# Patient Record
(demographics unavailable — no encounter records)

---

## 2024-11-26 NOTE — ASSESSMENT
[FreeTextEntry1] : Mr. JOSÉ MIGUEL SWEET is a 55 year year old man presents with change in LUTS. Two weeks of increased frequency, urgency, nocturia. No significant dysuria. Possible infection or prostatitis. Will check UA and urine culture. If positive, will treat. If negative, will likely treat empirically for prostatitis.   - F/U UA, UCx

## 2024-11-26 NOTE — PHYSICAL EXAM
[Normal Appearance] : normal appearance [Well Groomed] : well groomed [General Appearance - In No Acute Distress] : no acute distress [Edema] : no peripheral edema [Respiration, Rhythm And Depth] : normal respiratory rhythm and effort [Exaggerated Use Of Accessory Muscles For Inspiration] : no accessory muscle use [Abdomen Soft] : soft [Abdomen Tenderness] : non-tender [Costovertebral Angle Tenderness] : no ~M costovertebral angle tenderness [Normal Station and Gait] : the gait and station were normal for the patient's age [] : no rash [No Focal Deficits] : no focal deficits [Affect] : the affect was normal [Oriented To Time, Place, And Person] : oriented to person, place, and time [Mood] : the mood was normal

## 2024-11-26 NOTE — HISTORY OF PRESENT ILLNESS
[FreeTextEntry1] : 7/20/22: 55 year old man with history of kidney stone, presented to [facility] ED on 6/28/22 with left lower quadrant abdominal pain found to have a 4 mm left distal ureteral stone. He has had intermittent LLQ and penile pain with hematuria since then. He passed a 1 mm stone fragment, but has not caught a larger stone. He denies fevers, chills, dysuria.   8/9/22: Completed renal ultrasound 7/30/22. No hydronephrosis. Possible left distal ureteral stone, though on my review, this corresponds with phlebolith seen on CT scan. No pain. Continues to have occasional urinary hesitancy. No fevers, chills, dysuria, hematuria.  2/7/24: Doing well. No major change in urinary symptoms. No stone episodes. No fevers, chills, dysuria, hematuria. Mild LUTS that are not bothersome (frequency, hesitancy, nocturia).  11/26/24: Here due to increased nocturia. Now states nocturia x4, waking every 2 hours to urinate. This is interrupting his sleeping pattern which is making him more tired during the day. He also has low libido due to being tired.   Denies specific flank pain, but has chronic back pain and wears brace. Denies dysuria or hematuria. Most recent renal imaging was US in 2023 which showed no stones or hydro. Never had PSA done last visit.  PVR: 0 ml   : ID [id number] Jayden

## 2025-05-05 NOTE — ASSESSMENT
[FreeTextEntry1] :  Plan:  - Update HCC screening with MRI Abd - Repeat labs in 2 weeks - d/c Atorvastatin and f/u with Cardiologist for different HLD tx  - f/u appt 1 month  Michelet Maya is a 56 y/o male with compensated HBV Cirrhosis and HDV infection with plan to start Bulevirtide, here for follow-up.  #HBV Cirrhosis, well compensated, MELD 9 - Euvolemic on exam, no indication for diuretics - C/w daily weights and report > 2-3 lb/day weight gain - HE: well controlled, c/w Lactulose titrated to ~2-3 BMs/day - Reinforced patient is not allowed to drive or operate heavy machinery  - pHTN: 9/2024 EGD: No EV or PHG; previously tried on Carvedilol based on Baveno 7 criteria and did not tolerate - C/w home BP/HR monitoring - HCC: 8/22/24 MRI Abd w/o c/f malignancy > update HCC screening now - C/w Tenofovir for tx of HBV  - Plan to start HDV tx w/ Bulevirtide - C/w Methadone 190 mg/day (recently reduced by 5 mg) > continue taper as tolerated - Maintain ETOH cessation and reviewed risk for liver decompensation with relapse - Advised to avoid nephroxtoxic agents - Counseled on importance of preventing sarcopenia with high protein and low Na diet > encouraged to optimize metabolic profile - Reinforced s/s of liver decompensation and advised to report immediately - No indication for liver txp evaluation given well compensated disease and low MELD; will monitor clinical trajectory and refer if required  "Michelet Maya was seen today to initiate treatment with Bulevirtide 2mg subcutaneous injection, supplied by [employer], for Hepatitis Delta Virus (HDV). This medication is currently under FDA review for this indication and is being provided through a compassionate use program approved by the [facility] IRB. Mr. Maya provided informed consent for treatment with Bulevirtide after a thorough discussion of the risks and benefits. Mr. Maya and his significant other, Miryam, were present for patient education, which was provided by YAIR Anderson, PharmD Linda Inman, and JAMAL.   Education included: Mechanism of Action and Administration: Instruction on proper self-injection technique and medication storage. Potential Side Effects: Discussion of potential adverse events, including headache, pruritus, fatigue, eosinophilia, injection-site reactions, upper abdominal pain, arthralgia, and asthenia. These were reported as more common with Bulevirtide 2mg/10mg than in control groups, generally mild to moderate in severity, and typically did not lead to discontinuation in trials (NEJM). The incidence of these adverse events demonstrated a dose-dependent relationship in the Phase 3 bulevirtide trial (Chaya et al., NEJM), with higher frequencies observed in the 2 mg and 10 mg cohorts compared to the control group. Monitoring: Explained the importance of monitoring through laboratory testing in 2 weeks and monthly clinic visits. Mr. Casey were encouraged to call with any new symptoms or questions. Drug Interactions: Due to a potential drug-drug interaction with bulevirtide, his atorvastatin was discontinued. Mr. Maya will contact his cardiologist to discuss starting Repatha. Disease Management: Current medication list reviewed. No current evidence of worsening liver disease. Importance of continuing HCC screening reinforced. Mr. Maya and Miryam verbalized understanding of the treatment plan, potential side effects, monitoring schedule, and the need to discontinue atorvastatin and discuss Repatha with his cardiologist. The patient had ample opportunity to address any questions. There is no contraindications to proceed with medication initiation today.  Will proceed with injection/teaching of Bulevirtide.  Update labs in 2 weeks. RTC in 1 month. Patient seen and plan discussed with Dr. Brian.  Renetta Anderson, MSN, [facility] Transplant Hepatology Nurse Practitioner [facility] [address] [place] [id number] T: [phone] | F: [phone]

## 2025-05-05 NOTE — REVIEW OF SYSTEMS
[Fever] : no fever [Chills] : no chills [Feeling Poorly] : not feeling poorly [Feeling Tired] : not feeling tired [Recent Weight Gain (___ Lbs)] : no recent weight gain [Recent Weight Loss (___ Lbs)] : no recent weight loss [Chest Pain] : no chest pain [Palpitations] : no palpitations [Lower Ext Edema] : no extremity edema [Shortness Of Breath] : no shortness of breath [Cough] : no cough [Abdominal Pain] : no abdominal pain [Vomiting] : no vomiting [Constipation] : no constipation [Diarrhea] : no diarrhea [Melena] : no melena [Dysuria] : no dysuria [Limb Swelling] : no limb swelling [Itching] : no itching [Confused] : no confusion [Dizziness] : no dizziness

## 2025-05-05 NOTE — HISTORY OF PRESENT ILLNESS
[de-identified] : 56 y/o male with a PMH of HEVER on CPAP, GERD, kidney stones, CAD, thoracic aortic aneurysm, PAD, HLD, Chronic HBV and compensated cirrhosis, here for follow-up. Pt accompanied by significant other, Miryam. Feels well; no more smoking; meds reviewed. possible DDI with statin and new med reviewed.  no worsening liver decompensation symptoms Previous note Longstanding pt of Dr. Brian and followed at both [facility] and [facility]. Patient with longstanding hx of compensated HBV cirrhosis -- compliant with Tenofovir. No hx of liver lesions or HCC. Tried on Carvedilol 3.125 mg PO BID for primary EV ppx based on Bavmagdiel 7, however, pt did not tolerate (lightheadedness and significant fatigue) and med d/c. Now compliant with CPAP for HEVER. Stopped all ETOH use once dx with liver disease.  - 7/9/21 EGD: No EV and mild portal HTN gastropathy - 2/2023 Fibroscan: S2-3/F4 - 10/14/23 MRI Abd is WNL - 9/2024 EGD: No EV or PHG - 4/23/25 labs: HDV PCR 12.9K copies / log 4.11  In the interim since last visit, there have been no interim illnesses or hospitalizations. No episodes of liver decompensation. Given e/o HDV viremia we obtained compassionate use of Bulevirtide and plan to start tx today, 5/1. Still following with Cardiology/Vascular and maintained on ASA alone (no longer on Plavix). Patient's allergies, medications, past medical, surgical, family, and social histories were reviewed and updated as appropriate. Seen in clinic today, reports that he feels well and is w/o complaints. Denies any recent fevers, chills, cough, lightheadedness, AMS, abdominal pain, n/v, diarrhea, hematochezia, hematemesis, and melena. Denies alcohol, tobacco, or recreational drug use. No longer cigarette smoking for one year.   MELD 9 (4/2025) [FreeTextEntry1] : Transplant Hepatologist: Urmila Brian DO Transplant Hepatology NP: Renetta Anderson, [facility]  Michelet Maya is a 58 y/o male with a PMH of HEVER on CPAP, GERD, kidney stones, CAD, thoracic aortic aneurysm, PAD, HLD, Chronic HBV and compensated HBV cirrhosis, here for follow-up.  Longstanding pt of Dr. Brian and followed at both [facility] and [facility]. Patient with longstanding hx of compensated HBV cirrhosis -- compliant with Tenofovir. No hx of liver lesions or HCC. Tried on Carvedilol 3.125 mg PO BID for primary EV ppx based on Baveno 7, however, pt did not tolerate (lightheadedness and significant fatigue) and med d/c. Now compliant with CPAP for HEVER. Stopped all ETOH use once dx with liver disease.  - 7/9/21 EGD: No EV and mild portal HTN gastropathy - 2/2023 Fibroscan: S2-3/F4 - 10/14/23 MRI Abd is WNL - 9/2024 EGD: No EV or PHG - 4/23/25 labs: HDV PCR 12.9K copies / log 4.11  In the interim since last visit, there have been no interim illnesses or hospitalizations. No episodes of liver decompensation. Given e/o HDV viremia we obtained compassionate use of Bulevirtide and plan to start tx today, 5/1. Still on Lactulose for HE and reports well controlled sx on current rx. Still following with Cardiology/Vascular and maintained on ASA alone (no longer on Plavix). Reports compliance with Tenofovir and denies any missed doses. Patient's allergies, medications, past medical, surgical, family, and social histories were reviewed and updated as appropriate. Seen in clinic today, accompanied by his wife (Miryam). Reports that he feels well and is w/o complaints. Denies any recent fevers, chills, cough, lightheadedness, AMS, abdominal pain, n/v, diarrhea, hematochezia, hematemesis, and melena. Denies alcohol, tobacco, or recreational drug use. No longer cigarette smoking for one year.   MELD 9 (4/2025)

## 2025-05-05 NOTE — HISTORY OF PRESENT ILLNESS
[de-identified] : 58 y/o male with a PMH of HEVRE on CPAP, GERD, kidney stones, CAD, thoracic aortic aneurysm, PAD, HLD, Chronic HBV and compensated cirrhosis, here for follow-up. Pt accompanied by significant other, Miryam. Feels well; no more smoking; meds reviewed. possible DDI with statin and new med reviewed.  no worsening liver decompensation symptoms Previous note Longstanding pt of Dr. Brian and followed at both [facility] and [facility]. Patient with longstanding hx of compensated HBV cirrhosis -- compliant with Tenofovir. No hx of liver lesions or HCC. Tried on Carvedilol 3.125 mg PO BID for primary EV ppx based on Bavmagdiel 7, however, pt did not tolerate (lightheadedness and significant fatigue) and med d/c. Now compliant with CPAP for HEVER. Stopped all ETOH use once dx with liver disease.  - 7/9/21 EGD: No EV and mild portal HTN gastropathy - 2/2023 Fibroscan: S2-3/F4 - 10/14/23 MRI Abd is WNL - 9/2024 EGD: No EV or PHG - 4/23/25 labs: HDV PCR 12.9K copies / log 4.11  In the interim since last visit, there have been no interim illnesses or hospitalizations. No episodes of liver decompensation. Given e/o HDV viremia we obtained compassionate use of Bulevirtide and plan to start tx today, 5/1. Still following with Cardiology/Vascular and maintained on ASA alone (no longer on Plavix). Patient's allergies, medications, past medical, surgical, family, and social histories were reviewed and updated as appropriate. Seen in clinic today, reports that he feels well and is w/o complaints. Denies any recent fevers, chills, cough, lightheadedness, AMS, abdominal pain, n/v, diarrhea, hematochezia, hematemesis, and melena. Denies alcohol, tobacco, or recreational drug use. No longer cigarette smoking for one year.   MELD 9 (4/2025) [FreeTextEntry1] : Transplant Hepatologist: Urmila Brian DO Transplant Hepatology NP: Renetta Anderson, [facility]  Michelet Maya is a 58 y/o male with a PMH of HEVER on CPAP, GERD, kidney stones, CAD, thoracic aortic aneurysm, PAD, HLD, Chronic HBV and compensated HBV cirrhosis, here for follow-up.  Longstanding pt of Dr. Brian and followed at both [facility] and [facility]. Patient with longstanding hx of compensated HBV cirrhosis -- compliant with Tenofovir. No hx of liver lesions or HCC. Tried on Carvedilol 3.125 mg PO BID for primary EV ppx based on Baveno 7, however, pt did not tolerate (lightheadedness and significant fatigue) and med d/c. Now compliant with CPAP for HEVER. Stopped all ETOH use once dx with liver disease.  - 7/9/21 EGD: No EV and mild portal HTN gastropathy - 2/2023 Fibroscan: S2-3/F4 - 10/14/23 MRI Abd is WNL - 9/2024 EGD: No EV or PHG - 4/23/25 labs: HDV PCR 12.9K copies / log 4.11  In the interim since last visit, there have been no interim illnesses or hospitalizations. No episodes of liver decompensation. Given e/o HDV viremia we obtained compassionate use of Bulevirtide and plan to start tx today, 5/1. Still on Lactulose for HE and reports well controlled sx on current rx. Still following with Cardiology/Vascular and maintained on ASA alone (no longer on Plavix). Reports compliance with Tenofovir and denies any missed doses. Patient's allergies, medications, past medical, surgical, family, and social histories were reviewed and updated as appropriate. Seen in clinic today, accompanied by his wife (Miryam). Reports that he feels well and is w/o complaints. Denies any recent fevers, chills, cough, lightheadedness, AMS, abdominal pain, n/v, diarrhea, hematochezia, hematemesis, and melena. Denies alcohol, tobacco, or recreational drug use. No longer cigarette smoking for one year.   MELD 9 (4/2025)

## 2025-05-05 NOTE — ASSESSMENT
[FreeTextEntry1] :  Plan:  - Update HCC screening with MRI Abd - Repeat labs in 2 weeks - d/c Atorvastatin and f/u with Cardiologist for different HLD tx  - f/u appt 1 month  Michelet Maya is a 58 y/o male with compensated HBV Cirrhosis and HDV infection with plan to start Bulevirtide, here for follow-up.  #HBV Cirrhosis, well compensated, MELD 9 - Euvolemic on exam, no indication for diuretics - C/w daily weights and report > 2-3 lb/day weight gain - HE: well controlled, c/w Lactulose titrated to ~2-3 BMs/day - Reinforced patient is not allowed to drive or operate heavy machinery  - pHTN: 9/2024 EGD: No EV or PHG; previously tried on Carvedilol based on Baveno 7 criteria and did not tolerate - C/w home BP/HR monitoring - HCC: 8/22/24 MRI Abd w/o c/f malignancy > update HCC screening now - C/w Tenofovir for tx of HBV  - Plan to start HDV tx w/ Bulevirtide - C/w Methadone 190 mg/day (recently reduced by 5 mg) > continue taper as tolerated - Maintain ETOH cessation and reviewed risk for liver decompensation with relapse - Advised to avoid nephroxtoxic agents - Counseled on importance of preventing sarcopenia with high protein and low Na diet > encouraged to optimize metabolic profile - Reinforced s/s of liver decompensation and advised to report immediately - No indication for liver txp evaluation given well compensated disease and low MELD; will monitor clinical trajectory and refer if required  "Michelet Maya was seen today to initiate treatment with Bulevirtide 2mg subcutaneous injection, supplied by [employer] for Hepatitis Delta Virus (HDV). This medication is currently under FDA review for this indication and is being provided through a compassionate use program approved by the [facility] IRB. Mr. Maya provided informed consent for treatment with Bulevirtide after a thorough discussion of the risks and benefits. Mr. Maya and his significant other, Miryam, were present for patient education, which was provided by YAIR Anderson, PharmD Linda Inman, and JAMAL.   Education included: Mechanism of Action and Administration: Instruction on proper self-injection technique and medication storage. Potential Side Effects: Discussion of potential adverse events, including headache, pruritus, fatigue, eosinophilia, injection-site reactions, upper abdominal pain, arthralgia, and asthenia. These were reported as more common with Bulevirtide 2mg/10mg than in control groups, generally mild to moderate in severity, and typically did not lead to discontinuation in trials (NEJM). The incidence of these adverse events demonstrated a dose-dependent relationship in the Phase 3 bulevirtide trial (Chaya et al., NEJM), with higher frequencies observed in the 2 mg and 10 mg cohorts compared to the control group. Monitoring: Explained the importance of monitoring through laboratory testing in 2 weeks and monthly clinic visits. Mr. Casey were encouraged to call with any new symptoms or questions. Drug Interactions: Due to a potential drug-drug interaction with bulevirtide, his atorvastatin was discontinued. Mr. Maya will contact his cardiologist to discuss starting Repatha. Disease Management: Current medication list reviewed. No current evidence of worsening liver disease. Importance of continuing HCC screening reinforced. Mr. Maya and Miryam verbalized understanding of the treatment plan, potential side effects, monitoring schedule, and the need to discontinue atorvastatin and discuss Repatha with his cardiologist. The patient had ample opportunity to address any questions. There is no contraindications to proceed with medication initiation today.  Will proceed with injection/teaching of Bulevirtide.  Update labs in 2 weeks. RTC in 1 month. Patient seen and plan discussed with Dr. Brian.  Renetta Anderson, MSN, [facility] Transplant Hepatology Nurse Practitioner [facility] [address] [place] [id number] T: [phone] | F: [phone]